# Patient Record
Sex: MALE | Race: WHITE | NOT HISPANIC OR LATINO | Employment: FULL TIME | URBAN - METROPOLITAN AREA
[De-identification: names, ages, dates, MRNs, and addresses within clinical notes are randomized per-mention and may not be internally consistent; named-entity substitution may affect disease eponyms.]

---

## 2020-11-16 ENCOUNTER — OFFICE VISIT (OUTPATIENT)
Dept: NEUROSURGERY | Facility: CLINIC | Age: 65
End: 2020-11-16

## 2020-11-16 VITALS
WEIGHT: 209.8 LBS | TEMPERATURE: 97.6 F | HEIGHT: 72 IN | DIASTOLIC BLOOD PRESSURE: 90 MMHG | BODY MASS INDEX: 28.42 KG/M2 | SYSTOLIC BLOOD PRESSURE: 138 MMHG

## 2020-11-16 DIAGNOSIS — M50.20 HERNIATED CERVICAL DISC: Primary | ICD-10-CM

## 2020-11-16 PROCEDURE — 99203 OFFICE O/P NEW LOW 30 MIN: CPT | Performed by: NEUROLOGICAL SURGERY

## 2020-11-16 RX ORDER — NABUMETONE 750 MG/1
750 TABLET, FILM COATED ORAL 2 TIMES DAILY
Qty: 60 TABLET | Refills: 0 | Status: SHIPPED | OUTPATIENT
Start: 2020-11-16 | End: 2020-12-17

## 2020-11-16 RX ORDER — METHOCARBAMOL 750 MG/1
750 TABLET, FILM COATED ORAL NIGHTLY
Qty: 30 TABLET | Refills: 0 | Status: SHIPPED | OUTPATIENT
Start: 2020-11-16 | End: 2020-12-14
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
## 2020-11-23 ENCOUNTER — TELEPHONE (OUTPATIENT)
Dept: NEUROSURGERY | Facility: CLINIC | Age: 65
End: 2020-11-23

## 2020-11-23 NOTE — TELEPHONE ENCOUNTER
PLEASE SEND OV AND FUTURE APPTS TO [email or web address] [phone]. FAX    [phone] DIRECT PHONE FOR ANY QUESTIONS

## 2020-11-24 NOTE — TELEPHONE ENCOUNTER
FOLLOWING [employer], ALL W/C CLAIMS ARE FORWARDED BY TELEPHONE ENCOUNTER TO NON CLINICAL TEAM FOR PROCESSING.

## 2020-12-14 RX ORDER — METHOCARBAMOL 750 MG/1
TABLET, FILM COATED ORAL
Qty: 30 TABLET | Refills: 0 | Status: SHIPPED | OUTPATIENT
Start: 2020-12-14 | End: 2021-01-13

## 2020-12-14 NOTE — TELEPHONE ENCOUNTER
Requested Prescriptions     Pending Prescriptions Disp Refills   • methocarbamol (ROBAXIN) 750 MG tablet [Pharmacy Med Name: METHOCARBAMOL 750 MG TABLET] 30 tablet 0     Sig: TAKE 1 TABLET BY MOUTH EVERY NIGHT

## 2020-12-17 ENCOUNTER — TELEPHONE (OUTPATIENT)
Dept: NEUROSURGERY | Facility: CLINIC | Age: 65
End: 2020-12-17

## 2020-12-17 RX ORDER — NABUMETONE 750 MG/1
TABLET, FILM COATED ORAL
Qty: 60 TABLET | Refills: 0 | Status: SHIPPED | OUTPATIENT
Start: 2020-12-17 | End: 2020-12-23

## 2020-12-17 NOTE — TELEPHONE ENCOUNTER
Requested Prescriptions     Pending Prescriptions Disp Refills   • nabumetone (RELAFEN) 750 MG tablet [Pharmacy Med Name: NABUMETONE 750 MG TABLET] 60 tablet 0     Sig: TAKE 1 TABLET BY MOUTH TWICE A DAY

## 2020-12-22 NOTE — TELEPHONE ENCOUNTER
Insurance will not pay for Nabumetone.  Would you like to change?  (suggested alternatives from the pharmacy are Ibuprofen 600 mg or Naproxen 500 mg.)

## 2020-12-23 RX ORDER — NAPROXEN 500 MG/1
500 TABLET ORAL 2 TIMES DAILY WITH MEALS
Qty: 60 TABLET | Refills: 2 | Status: SHIPPED | OUTPATIENT
Start: 2020-12-23

## 2021-01-13 RX ORDER — METHOCARBAMOL 750 MG/1
TABLET, FILM COATED ORAL
Qty: 30 TABLET | Refills: 0 | Status: SHIPPED | OUTPATIENT
Start: 2021-01-13 | End: 2021-07-19

## 2021-04-07 RX ORDER — NAPROXEN 500 MG/1
TABLET ORAL
Qty: 60 TABLET | Refills: 2 | OUTPATIENT
Start: 2021-04-07

## 2021-04-07 NOTE — TELEPHONE ENCOUNTER
Provider:  Michael  Caller:  Automated refill request  Surgery:  NA  Surgery Date:    Last visit:  11/16/20  Next visit: NA    Reason for call:         Requested Prescriptions     Pending Prescriptions Disp Refills   • naproxen (NAPROSYN) 500 MG tablet [Pharmacy Med Name: NAPROXEN 500 MG TABLET] 60 tablet 2     Sig: TAKE 1 TABLET BY MOUTH TWICE A DAY WITH MEALS     Last rx approved 12/17/20

## 2021-04-30 ENCOUNTER — TELEPHONE (OUTPATIENT)
Dept: NEUROSURGERY | Facility: CLINIC | Age: 66
End: 2021-04-30

## 2021-04-30 NOTE — TELEPHONE ENCOUNTER
Pt called: he had MRI lumbar on 4/2021 @ [facility]. He is going to call his PCP and have the mri report faxed to [phone]. He would like to see if Dr. Rodriguez will refer him to one of the other providers in the office once his mri has been obtained. Please advise!    Pt contact: [phone]  Best time to call: anytime

## 2021-05-04 ENCOUNTER — TELEPHONE (OUTPATIENT)
Dept: NEUROSURGERY | Facility: CLINIC | Age: 66
End: 2021-05-04

## 2021-05-04 NOTE — TELEPHONE ENCOUNTER
Provider:  Michael  Caller: pt  Time of call:     Phone #:  [phone]  Surgery:  na  Surgery Date:  na  Last visit:   11-  Next visit: talisha HAGAN:         Reason for call:     Patient called and was asking about surgery and or MRI.   I called patient back and he said someone had just called him and things had been taken care of but thanked me for the call back.

## 2021-05-07 ENCOUNTER — OFFICE VISIT (OUTPATIENT)
Dept: NEUROSURGERY | Facility: CLINIC | Age: 66
End: 2021-05-07

## 2021-05-07 VITALS
HEIGHT: 72 IN | HEART RATE: 56 BPM | TEMPERATURE: 96.9 F | WEIGHT: 216 LBS | OXYGEN SATURATION: 100 % | DIASTOLIC BLOOD PRESSURE: 86 MMHG | RESPIRATION RATE: 18 BRPM | SYSTOLIC BLOOD PRESSURE: 128 MMHG | BODY MASS INDEX: 29.26 KG/M2

## 2021-05-07 DIAGNOSIS — M47.812 CHRONIC NECK PAIN WITH OSTEOARTHRITIS DETERMINED BY X-RAY: ICD-10-CM

## 2021-05-07 DIAGNOSIS — M47.816 SPONDYLOSIS OF LUMBAR REGION WITHOUT MYELOPATHY OR RADICULOPATHY: ICD-10-CM

## 2021-05-07 DIAGNOSIS — G89.29 CHRONIC NECK PAIN WITH OSTEOARTHRITIS DETERMINED BY X-RAY: ICD-10-CM

## 2021-05-07 DIAGNOSIS — M51.36 DDD (DEGENERATIVE DISC DISEASE), LUMBAR: ICD-10-CM

## 2021-05-07 DIAGNOSIS — M50.30 DEGENERATIVE DISC DISEASE, CERVICAL: ICD-10-CM

## 2021-05-07 DIAGNOSIS — M54.50 CHRONIC BILATERAL LOW BACK PAIN WITHOUT SCIATICA: ICD-10-CM

## 2021-05-07 DIAGNOSIS — M47.812 CERVICAL SPONDYLOSIS WITHOUT MYELOPATHY: ICD-10-CM

## 2021-05-07 DIAGNOSIS — G89.29 CHRONIC BILATERAL LOW BACK PAIN WITHOUT SCIATICA: ICD-10-CM

## 2021-05-07 DIAGNOSIS — G57.12 MERALGIA PARESTHETICA OF LEFT SIDE: Primary | ICD-10-CM

## 2021-05-07 PROBLEM — M51.369 DDD (DEGENERATIVE DISC DISEASE), LUMBAR: Status: ACTIVE | Noted: 2021-05-07

## 2021-05-07 PROCEDURE — 99214 OFFICE O/P EST MOD 30 MIN: CPT | Performed by: PHYSICIAN ASSISTANT

## 2021-05-07 RX ORDER — ASPIRIN 81 MG/1
TABLET, CHEWABLE ORAL
COMMUNITY
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
## 2021-05-12 ENCOUNTER — TELEPHONE (OUTPATIENT)
Dept: NEUROSURGERY | Facility: CLINIC | Age: 66
End: 2021-05-12

## 2021-05-12 DIAGNOSIS — M51.36 DDD (DEGENERATIVE DISC DISEASE), LUMBAR: Primary | ICD-10-CM

## 2021-05-12 DIAGNOSIS — G89.29 CHRONIC BILATERAL LOW BACK PAIN WITHOUT SCIATICA: ICD-10-CM

## 2021-05-12 DIAGNOSIS — M47.812 CERVICAL SPONDYLOSIS WITHOUT MYELOPATHY: ICD-10-CM

## 2021-05-12 DIAGNOSIS — M47.812 CHRONIC NECK PAIN WITH OSTEOARTHRITIS DETERMINED BY X-RAY: ICD-10-CM

## 2021-05-12 DIAGNOSIS — M54.50 CHRONIC BILATERAL LOW BACK PAIN WITHOUT SCIATICA: ICD-10-CM

## 2021-05-12 DIAGNOSIS — G57.12 MERALGIA PARESTHETICA OF LEFT SIDE: ICD-10-CM

## 2021-05-12 DIAGNOSIS — G89.29 CHRONIC NECK PAIN WITH OSTEOARTHRITIS DETERMINED BY X-RAY: ICD-10-CM

## 2021-05-12 RX ORDER — GABAPENTIN 300 MG/1
CAPSULE ORAL
Qty: 40 CAPSULE | Refills: 1 | Status: SHIPPED | OUTPATIENT
Start: 2021-05-12 | End: 2021-07-19

## 2021-05-12 NOTE — PROGRESS NOTES
I spoke with Akin and he says that his leg pain is present all the time now, his thigh feels raw to the touch and he is having difficulty sleeping.  We have discussed further medications including Neurontin 300 mg at at bedtime and he is in agreement with his medication, it has been E prescribed.  We also discussed referral to pain management for a epidural steroid injection or a left facet injection at L5-S1 and he is in agreement.  The referral order has been put in for pain management.  I would like to talk to Akin on the phone about a week after he has his injection by pain management and we will make further recommendations as to his response.  He is in agreement with this plan.  He also continues with neck pain.    Kendal Wu PA-C

## 2021-06-07 ENCOUNTER — TELEPHONE (OUTPATIENT)
Dept: NEUROSURGERY | Facility: CLINIC | Age: 66
End: 2021-06-07
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
## 2021-06-07 NOTE — TELEPHONE ENCOUNTER
PATIENT'S wc is dening injections. his  says they should not fight this issue. We have discussed what I can provide with his findings on mri and his symptoms. He is in understanding.    cleopatra

## 2021-07-15 ENCOUNTER — TELEPHONE (OUTPATIENT)
Dept: NEUROSURGERY | Facility: CLINIC | Age: 66
End: 2021-07-15

## 2021-07-15 NOTE — TELEPHONE ENCOUNTER
RECVD NERVE CONDUCTION RESULTS FROM [facility] NOW INDEXED INTO CHART     THANK YOU

## 2021-07-15 NOTE — TELEPHONE ENCOUNTER
Provider:MIL  Caller: WILLIAM  Relationship to Patient: PT    Phone Number: [phone]  Reason for Call:     PT IS REQUESTING A CALL BACK FROM INDERJIT MALONE IN-REGARDS TO A NERVE CONDUCTION TEST HE HAD COMPLETED AT [facility] BY DR OCHOA.     PT ALSO STATED THAT HE WOULD LIKE TO HAVE A SURGERY CONSULT.     CALLED [facility] [phone] SPK WITH CARLEE SHE WILL BE  FAXING RESULTS TO THE HUB, WHEN RECVD I WILL UPLOAD THEM.     PLEASE CALL PT AND ADVISE     THANK YOU

## 2021-07-19 ENCOUNTER — OFFICE VISIT (OUTPATIENT)
Dept: NEUROSURGERY | Facility: CLINIC | Age: 66
End: 2021-07-19

## 2021-07-19 VITALS — WEIGHT: 213 LBS | BODY MASS INDEX: 28.85 KG/M2 | HEIGHT: 72 IN

## 2021-07-19 DIAGNOSIS — G89.29 CHRONIC NECK PAIN WITH OSTEOARTHRITIS DETERMINED BY X-RAY: Primary | ICD-10-CM

## 2021-07-19 DIAGNOSIS — M54.50 CHRONIC BILATERAL LOW BACK PAIN WITHOUT SCIATICA: ICD-10-CM

## 2021-07-19 DIAGNOSIS — G89.29 CHRONIC BILATERAL LOW BACK PAIN WITHOUT SCIATICA: ICD-10-CM

## 2021-07-19 DIAGNOSIS — M47.812 CHRONIC NECK PAIN WITH OSTEOARTHRITIS DETERMINED BY X-RAY: Primary | ICD-10-CM

## 2021-07-19 DIAGNOSIS — M51.36 DDD (DEGENERATIVE DISC DISEASE), LUMBAR: ICD-10-CM

## 2021-07-19 PROCEDURE — 99214 OFFICE O/P EST MOD 30 MIN: CPT | Performed by: PHYSICIAN ASSISTANT

## 2021-07-19 RX ORDER — ALBUTEROL SULFATE 90 UG/1
AEROSOL, METERED RESPIRATORY (INHALATION)
COMMUNITY
Start: 2021-06-09

## 2021-07-19 RX ORDER — OXYCODONE HYDROCHLORIDE 5 MG/1
5 TABLET ORAL 2 TIMES DAILY PRN
COMMUNITY
Start: 2021-06-16

## 2021-07-19 RX ORDER — ATORVASTATIN CALCIUM 10 MG/1
10 TABLET, FILM COATED ORAL EVERY MORNING
COMMUNITY
Start: 2021-06-18

## 2021-07-19 RX ORDER — PREGABALIN 75 MG/1
75 CAPSULE ORAL 2 TIMES DAILY
COMMUNITY
Start: 2021-06-01
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
## 2023-06-06 ENCOUNTER — OFFICE VISIT (OUTPATIENT)
Dept: CARDIOLOGY | Facility: CLINIC | Age: 68
End: 2023-06-06
Payer: MEDICARE

## 2023-06-06 VITALS
BODY MASS INDEX: 27.52 KG/M2 | DIASTOLIC BLOOD PRESSURE: 80 MMHG | HEIGHT: 72 IN | SYSTOLIC BLOOD PRESSURE: 124 MMHG | OXYGEN SATURATION: 98 % | HEART RATE: 73 BPM | WEIGHT: 203.2 LBS

## 2023-06-06 DIAGNOSIS — E78.2 MIXED HYPERLIPIDEMIA: ICD-10-CM

## 2023-06-06 DIAGNOSIS — R00.2 PALPITATIONS: Primary | ICD-10-CM

## 2023-06-06 DIAGNOSIS — R94.31 ABNORMAL ELECTROCARDIOGRAM (ECG) (EKG): ICD-10-CM

## 2023-06-06 RX ORDER — AMINOCAPROIC ACID 500 MG/1
TABLET ORAL EVERY 6 HOURS
COMMUNITY

## 2023-06-06 NOTE — PROGRESS NOTES
New Cardiology Patient Office Visit      Date: 2023  Patient Name: Akin Hermosillo  : 1955   MRN: [id number]   PCP: Kamala Monahan MD   Referring Provider: Kamala Monahan MD     Chief Complaint:    Chief Complaint   Patient presents with    Palpitations       History of Present Illness: Akin Hermosillo is a 68 y.o. male who is here today for follow-up on his palpitations.  For the last few months he is having severe palpitations.  Most of the time when he is eating he feels like his heart is racing sometimes it gives him a little anxiety sometimes it is associated with shortness of breath.  Most of it is just feeling heart skipping beats.  He had a family history of strokes and atrial fibrillation.  He has been under a lot of stress lately also.      Problem List     CARDIAC  Coronary Artery Disease:   Palpitations    Myocardium:   Being evaluated    Valvular:   No known valvular disease    Electrical:   Palpitations    Percardium:   Normal      CARDIAC RISK FACTORS:         Hypertension         Dyslipidemia:          Tobacco Use: Current Smoker    NON-CARDIAC:  Bilateral low back pain  Cervical spondylolysis  Degenerative disc disease    SURGERIES:  Right rotator cuff surgery    Subjective      Review of Systems:   Review of Systems   Cardiovascular:  Positive for palpitations.     Medications:   Current Outpatient Medications   Medication Sig Dispense Refill    aminocaproic acid (AMICAR) 500 MG tablet Take  by mouth Every 6 (Six) Hours.      naproxen (Naprosyn) 500 MG tablet Take 1 tablet by mouth 2 (Two) Times a Day With Meals. 60 tablet 2     No current facility-administered medications for this visit.           The following portions of the patient's history were reviewed and updated as appropriate: allergies, current medications, past family history, past medical history, past social history, past surgical history and problem list.    Objective     Physical Exam:  Vital Signs:   Vitals:     "06/06/23 1447   BP: 124/80   BP Location: Right arm   Patient Position: Sitting   Pulse: 73   SpO2: 98%   Weight: 92.2 kg (203 lb 3.2 oz)   Height: 182.9 cm (72\")     Body mass index is 27.56 kg/m².     Constitutional:       General: Not in acute distress.     Appearance: Healthy appearance. Not in distress.     Neck:     JVP: Not elevated     Carotid artery: No carotid bruit    Pulmonary:      Effort: Pulmonary effort is normal.      Breath sounds: Normal breath sounds. No wheezing. No rhonchi. No rales.     Cardiovascular:      Normal rate. Regular rhythm. Normal S1. Normal S2.      Murmurs: There is no murmur.      No gallop. No click. No rub.     Abdominal:      General: Bowel sounds are normal.      Palpations: Abdomen is soft.      Tenderness: There is no abdominal tenderness.    Extremities:     Pulses: Good distal pulses     Edema: No edema    Labs:  Requested lab work      ECG 12 Lead    Date/Time: 6/6/2023 3:27 PM  Performed by: Alka Casper MD  Authorized by: Alka Casper MD   Previous ECG: no previous ECG available  Rhythm: sinus rhythm  Other findings: non-specific ST-T wave changes    Clinical impression: abnormal EKG        Smoking Cessation:   less than 3 minutes spent counseling Not agreeable to stopping    Assessment / Plan      Assessment & Plan    Assessment:   Diagnosis Plan   1. Palpitations  Holter Monitor - 72 Hour Up To 15 Days      2. Mixed hyperlipidemia  Adult Transthoracic Echo Complete W/ Cont if Necessary Per Protocol    Stress Test With Myocardial Perfusion One Day      3. Abnormal electrocardiogram (ECG) (EKG)  Adult Transthoracic Echo Complete W/ Cont if Necessary Per Protocol    Stress Test With Myocardial Perfusion One Day           Plan:      Patient has multiple uncontrolled risk factors for his coronary artery disease.  We will go ahead and order the Holter monitor for further evaluation of his palpitations and arrhythmias.    He has been started on Lipitor but he could " not tolerate the dose because of severe arthralgias.  We will repeat his blood work in the next few months and we will see if he can do with alternate medications.    We will get a echocardiogram for further evaluation of his heart function for palpitations.            Follow Up:   Return in about 3 months (around 9/6/2023).    Alka Casper MD

## 2023-07-24 ENCOUNTER — TELEPHONE (OUTPATIENT)
Dept: CARDIOLOGY | Facility: CLINIC | Age: 68
End: 2023-07-24
Payer: MEDICARE

## 2023-07-24 NOTE — TELEPHONE ENCOUNTER
----- Message from Alka Casper MD sent at 7/21/2023  6:20 PM EDT -----  Please call the patient regarding his abnormal result.  Few extra beats were seen we will can continue treating the same

## 2023-07-25 ENCOUNTER — HOSPITAL ENCOUNTER (OUTPATIENT)
Dept: CARDIOLOGY | Facility: HOSPITAL | Age: 68
Discharge: HOME | End: 2023-07-25
Payer: MEDICARE

## 2023-07-25 VITALS
SYSTOLIC BLOOD PRESSURE: 111 MMHG | BODY MASS INDEX: 27.5 KG/M2 | DIASTOLIC BLOOD PRESSURE: 84 MMHG | HEIGHT: 72 IN | WEIGHT: 203 LBS

## 2023-07-25 DIAGNOSIS — R94.31 ABNORMAL ELECTROCARDIOGRAM (ECG) (EKG): ICD-10-CM

## 2023-07-25 DIAGNOSIS — E78.2 MIXED HYPERLIPIDEMIA: ICD-10-CM

## 2023-07-25 PROCEDURE — 93306 TTE W/DOPPLER COMPLETE: CPT

## 2023-07-31 LAB
ASCENDING AORTA: 3.5 CM
BH CV ECHO MEAS - AO MAX PG: 7 MMHG
BH CV ECHO MEAS - AO MEAN PG: 3.3 MMHG
BH CV ECHO MEAS - AO ROOT AREA (BSA CORRECTED): 1.5 CM2
BH CV ECHO MEAS - AO ROOT DIAM: 3.2 CM
BH CV ECHO MEAS - AO V2 MAX: 132 CM/SEC
BH CV ECHO MEAS - AO V2 VTI: 26.3 CM
BH CV ECHO MEAS - AVA(I,D): 2.25 CM2
BH CV ECHO MEAS - EDV(CUBED): 83.9 ML
BH CV ECHO MEAS - EDV(MOD-SP2): 108 ML
BH CV ECHO MEAS - EDV(MOD-SP4): 126 ML
BH CV ECHO MEAS - EF(MOD-BP): 63 %
BH CV ECHO MEAS - EF(MOD-SP2): 65.2 %
BH CV ECHO MEAS - EF(MOD-SP4): 57.4 %
BH CV ECHO MEAS - ESV(CUBED): 40.4 ML
BH CV ECHO MEAS - ESV(MOD-SP2): 37.6 ML
BH CV ECHO MEAS - ESV(MOD-SP4): 53.7 ML
BH CV ECHO MEAS - FS: 21.6 %
BH CV ECHO MEAS - IVS/LVPW: 1.05 CM
BH CV ECHO MEAS - IVSD: 0.97 CM
BH CV ECHO MEAS - LA DIMENSION: 3 CM
BH CV ECHO MEAS - LAT PEAK E' VEL: 11.4 CM/SEC
BH CV ECHO MEAS - LV DIASTOLIC VOL/BSA (35-75): 58.8 CM2
BH CV ECHO MEAS - LV MASS(C)D: 136.8 GRAMS
BH CV ECHO MEAS - LV MAX PG: 4.2 MMHG
BH CV ECHO MEAS - LV MEAN PG: 1.59 MMHG
BH CV ECHO MEAS - LV SYSTOLIC VOL/BSA (12-30): 25 CM2
BH CV ECHO MEAS - LV V1 MAX: 102.4 CM/SEC
BH CV ECHO MEAS - LV V1 VTI: 20.9 CM
BH CV ECHO MEAS - LVIDD: 4.4 CM
BH CV ECHO MEAS - LVIDS: 3.4 CM
BH CV ECHO MEAS - LVOT AREA: 2.8 CM2
BH CV ECHO MEAS - LVOT DIAM: 1.9 CM
BH CV ECHO MEAS - LVPWD: 0.93 CM
BH CV ECHO MEAS - MED PEAK E' VEL: 8.6 CM/SEC
BH CV ECHO MEAS - MV A MAX VEL: 66.8 CM/SEC
BH CV ECHO MEAS - MV DEC SLOPE: 333.8 CM/SEC2
BH CV ECHO MEAS - MV DEC TIME: 0.23 MSEC
BH CV ECHO MEAS - MV E MAX VEL: 75.4 CM/SEC
BH CV ECHO MEAS - MV E/A: 1.13
BH CV ECHO MEAS - MV MAX PG: 3.5 MMHG
BH CV ECHO MEAS - MV MEAN PG: 1.91 MMHG
BH CV ECHO MEAS - MV P1/2T: 83.9 MSEC
BH CV ECHO MEAS - MV V2 VTI: 30.7 CM
BH CV ECHO MEAS - MVA(P1/2T): 2.6 CM2
BH CV ECHO MEAS - MVA(VTI): 1.93 CM2
BH CV ECHO MEAS - PA ACC TIME: 0.16 SEC
BH CV ECHO MEAS - RAP SYSTOLE: 3 MMHG
BH CV ECHO MEAS - RVSP: 18 MMHG
BH CV ECHO MEAS - SI(MOD-SP2): 32.8 ML/M2
BH CV ECHO MEAS - SI(MOD-SP4): 33.7 ML/M2
BH CV ECHO MEAS - SV(LVOT): 59.2 ML
BH CV ECHO MEAS - SV(MOD-SP2): 70.4 ML
BH CV ECHO MEAS - SV(MOD-SP4): 72.3 ML
BH CV ECHO MEAS - TAPSE (>1.6): 2.7 CM
BH CV ECHO MEAS - TR MAX PG: 14.6 MMHG
BH CV ECHO MEAS - TR MAX VEL: 190.8 CM/SEC
BH CV ECHO MEASUREMENTS AVERAGE E/E' RATIO: 7.54
BH CV XLRA - RV BASE: 3.6 CM
BH CV XLRA - RV LENGTH: 6.8 CM
BH CV XLRA - RV MID: 2.8 CM
BH CV XLRA - TDI S': 10.1 CM/SEC
LEFT ATRIUM VOLUME INDEX: 20.6 ML/M2

## 2023-08-01 ENCOUNTER — TELEPHONE (OUTPATIENT)
Dept: CARDIOLOGY | Facility: CLINIC | Age: 68
End: 2023-08-01
Payer: MEDICARE

## 2024-07-10 ENCOUNTER — TELEPHONE (OUTPATIENT)
Dept: CARDIOLOGY | Facility: CLINIC | Age: 69
End: 2024-07-10
Payer: MEDICARE

## 2024-07-10 DIAGNOSIS — R94.31 ABNORMAL ECG: Primary | ICD-10-CM

## 2024-07-10 DIAGNOSIS — E78.5 HYPERLIPIDEMIA LDL GOAL <70: ICD-10-CM

## 2024-07-10 NOTE — TELEPHONE ENCOUNTER
Patient called and he needs a colonoscopy, they told him he needs a stress test before. We ordered one but he never got it. Do we need to see him again before we reorder? We haven't seen him since June of last year...

## 2024-08-01 ENCOUNTER — HOSPITAL ENCOUNTER (OUTPATIENT)
Dept: CARDIOLOGY | Facility: HOSPITAL | Age: 69
Discharge: HOME | End: 2024-08-01
Payer: MEDICARE

## 2024-08-01 VITALS — DIASTOLIC BLOOD PRESSURE: 82 MMHG | HEART RATE: 70 BPM | OXYGEN SATURATION: 96 % | SYSTOLIC BLOOD PRESSURE: 142 MMHG

## 2024-08-01 DIAGNOSIS — R94.31 ABNORMAL ECG: ICD-10-CM

## 2024-08-01 DIAGNOSIS — E78.5 HYPERLIPIDEMIA LDL GOAL <70: ICD-10-CM

## 2024-08-01 PROCEDURE — 78452 HT MUSCLE IMAGE SPECT MULT: CPT

## 2024-08-01 PROCEDURE — 93017 CV STRESS TEST TRACING ONLY: CPT

## 2024-08-01 PROCEDURE — A9500 TC99M SESTAMIBI: HCPCS | Performed by: INTERNAL MEDICINE

## 2024-08-01 PROCEDURE — 0 TECHNETIUM SESTAMIBI: Performed by: INTERNAL MEDICINE

## 2024-08-01 RX ADMIN — TECHNETIUM TC 99M SESTAMIBI 1 DOSE: 1 INJECTION INTRAVENOUS at 09:30

## 2024-08-01 RX ADMIN — TECHNETIUM TC 99M SESTAMIBI 1 DOSE: 1 INJECTION INTRAVENOUS at 07:30

## 2024-08-02 LAB
BH CV REST NUCLEAR ISOTOPE DOSE: 9.9 MCI
BH CV STRESS BP STAGE 1: NORMAL
BH CV STRESS BP STAGE 2: NORMAL
BH CV STRESS DURATION MIN STAGE 1: 3
BH CV STRESS DURATION MIN STAGE 2: 3
BH CV STRESS DURATION SEC STAGE 1: 0
BH CV STRESS DURATION SEC STAGE 2: 30
BH CV STRESS GRADE STAGE 1: 10
BH CV STRESS GRADE STAGE 2: 12
BH CV STRESS HR STAGE 1: 101
BH CV STRESS HR STAGE 2: 132
BH CV STRESS METS STAGE 1: 5
BH CV STRESS METS STAGE 2: 7.5
BH CV STRESS NUCLEAR ISOTOPE DOSE: 33 MCI
BH CV STRESS O2 STAGE 1: 98
BH CV STRESS O2 STAGE 2: 99
BH CV STRESS PROTOCOL 1: NORMAL
BH CV STRESS RECOVERY BP: NORMAL MMHG
BH CV STRESS RECOVERY HR: 81 BPM
BH CV STRESS RECOVERY O2: 97 %
BH CV STRESS SPEED STAGE 1: 1.7
BH CV STRESS SPEED STAGE 2: 2.5
BH CV STRESS STAGE 1: 1
BH CV STRESS STAGE 2: 2
LV EF NUC BP: 72 %
MAXIMAL PREDICTED HEART RATE: 153 BPM
PERCENT MAX PREDICTED HR: 87.58 %
STRESS BASELINE BP: NORMAL MMHG
STRESS BASELINE HR: 70 BPM
STRESS O2 SAT REST: 96 %
STRESS PERCENT HR: 103 %
STRESS POST ESTIMATED WORKLOAD: 7 METS
STRESS POST EXERCISE DUR MIN: 6 MIN
STRESS POST EXERCISE DUR SEC: 30 SEC
STRESS POST O2 SAT PEAK: 99 %
STRESS POST PEAK BP: NORMAL MMHG
STRESS POST PEAK HR: 134 BPM
STRESS TARGET HR: 130 BPM